# Patient Record
Sex: MALE | Race: WHITE
[De-identification: names, ages, dates, MRNs, and addresses within clinical notes are randomized per-mention and may not be internally consistent; named-entity substitution may affect disease eponyms.]

---

## 2021-11-24 ENCOUNTER — HOSPITAL ENCOUNTER (EMERGENCY)
Dept: HOSPITAL 26 - MED | Age: 71
Discharge: HOME | End: 2021-11-24
Payer: COMMERCIAL

## 2021-11-24 VITALS — WEIGHT: 145 LBS | BODY MASS INDEX: 26.68 KG/M2 | HEIGHT: 62 IN

## 2021-11-24 VITALS — DIASTOLIC BLOOD PRESSURE: 79 MMHG | SYSTOLIC BLOOD PRESSURE: 146 MMHG

## 2021-11-24 VITALS — DIASTOLIC BLOOD PRESSURE: 69 MMHG | SYSTOLIC BLOOD PRESSURE: 128 MMHG

## 2021-11-24 DIAGNOSIS — E11.9: ICD-10-CM

## 2021-11-24 DIAGNOSIS — Z79.899: ICD-10-CM

## 2021-11-24 DIAGNOSIS — M79.605: Primary | ICD-10-CM

## 2021-11-24 DIAGNOSIS — F17.200: ICD-10-CM

## 2021-11-24 DIAGNOSIS — I10: ICD-10-CM

## 2021-11-24 DIAGNOSIS — Z79.82: ICD-10-CM

## 2021-11-24 PROCEDURE — 73590 X-RAY EXAM OF LOWER LEG: CPT

## 2021-11-24 PROCEDURE — 73502 X-RAY EXAM HIP UNI 2-3 VIEWS: CPT

## 2021-11-24 PROCEDURE — 73552 X-RAY EXAM OF FEMUR 2/>: CPT

## 2021-11-24 PROCEDURE — 72192 CT PELVIS W/O DYE: CPT

## 2021-11-24 PROCEDURE — 73630 X-RAY EXAM OF FOOT: CPT

## 2021-11-24 PROCEDURE — 99284 EMERGENCY DEPT VISIT MOD MDM: CPT

## 2021-11-24 PROCEDURE — 73562 X-RAY EXAM OF KNEE 3: CPT

## 2021-11-24 NOTE — NUR
PATIENT PRESENTS TO ED WITH LEFT LEG PAIN. WIFE STATES PT LIVES IN A NURSING 
FACILITY DUE TO PAST STROKE, X2 DAYS AGO HE BECAME UNABLE TO AMBULATE DUE TO 
LEFT LEG PAIN. FAMILY DENIES N/V/D; SKIN IS PINK/WARM/DRY; AAOX4; NON VERBAL; 
FAMILY DENIES ANY FEVER, CP, SOB, OR COUGH AT THIS TIME; FAMILY APPROXIMATES 
PAIN OF 8/10 AT THIS TIME; VSS; PATIENT POSITIONED FOR COMFORT; HOB ELEVATED; 
BEDRAILS UP X2; BED DOWN. ER MD MADE AWARE OF PT STATUS.

## 2021-11-24 NOTE — NUR
Patient discharged with v/s stable. Written and verbal after care instructions 
given and explained to spouse. Spouse verbalized understanding. Wheel Chair 
Assisted with to home. All questions addressed prior to discharge. Advised to 
follow up with PMD.